# Patient Record
Sex: MALE | Race: BLACK OR AFRICAN AMERICAN | NOT HISPANIC OR LATINO | Employment: FULL TIME | URBAN - METROPOLITAN AREA
[De-identification: names, ages, dates, MRNs, and addresses within clinical notes are randomized per-mention and may not be internally consistent; named-entity substitution may affect disease eponyms.]

---

## 2019-07-29 ENCOUNTER — HISTORICAL (OUTPATIENT)
Dept: SURGERY | Facility: HOSPITAL | Age: 59
End: 2019-07-29

## 2019-07-29 LAB — CRC RECOMMENDATION EXT: NORMAL

## 2019-08-14 ENCOUNTER — HISTORICAL (OUTPATIENT)
Dept: PREADMISSION TESTING | Facility: HOSPITAL | Age: 59
End: 2019-08-14

## 2021-11-12 ENCOUNTER — HISTORICAL (OUTPATIENT)
Dept: ADMINISTRATIVE | Facility: HOSPITAL | Age: 61
End: 2021-11-12

## 2021-11-26 ENCOUNTER — HISTORICAL (OUTPATIENT)
Dept: LAB | Facility: HOSPITAL | Age: 61
End: 2021-11-26

## 2021-12-01 ENCOUNTER — HISTORICAL (OUTPATIENT)
Dept: SURGERY | Facility: HOSPITAL | Age: 61
End: 2021-12-01

## 2022-04-07 ENCOUNTER — HISTORICAL (OUTPATIENT)
Dept: ADMINISTRATIVE | Facility: HOSPITAL | Age: 62
End: 2022-04-07
Payer: COMMERCIAL

## 2022-04-23 VITALS
DIASTOLIC BLOOD PRESSURE: 75 MMHG | SYSTOLIC BLOOD PRESSURE: 125 MMHG | BODY MASS INDEX: 33.89 KG/M2 | WEIGHT: 236.75 LBS | HEIGHT: 70 IN

## 2022-04-25 ENCOUNTER — HISTORICAL (OUTPATIENT)
Dept: ADMINISTRATIVE | Facility: HOSPITAL | Age: 62
End: 2022-04-25
Payer: COMMERCIAL

## 2022-04-28 NOTE — OP NOTE
DATE OF SURGERY:    07/29/2019    SURGEON:  Tobin Pemberton MD    PREOPERATIVE DIAGNOSIS:  Screening.    POSTOPERATIVE DIAGNOSIS:  Normal colon.    PROCEDURE:  Screening colonoscopy to the cecum.    ANESTHESIA:  Propofol.    ESTIMATED BLOOD LOSS:  None.    SPECIMENS:  None.    COMPLICATIONS:  None.    OPERATIVE PROCEDURE IN DETAIL:  After informed consent was obtained, the patient was brought to the operating room.  Continuous EKG, pulse oximetry, and intermittent blood pressure monitor were utilized.  The patient was placed in the left lateral decubitus position and propofol was administered by a member of the anesthesia team.  A digital rectal exam was performed and no mass or gross blood was identified.  Colonoscope was inserted into the rectum, insufflated and gently advanced in a retrograde fashion to the level of the cecum.  The ileocecal valve and appendiceal orifice were identified.  Photos were taken.  Colonoscope was slowly retracted and the colonic mucosa examined in a 360-degree fashion.  No masses, polyps or other mucosal abnormalities were noted.  Colonoscope was retracted into the rectum while decompressing the colon.  It was retroflexed and no anorectal pathology was seen.  Colonoscope was returned to the neutral position and completely removed from the patient.  He tolerated the procedure well and there were no complications.  Subsequently, he was transferred to recovery in satisfactory condition.      PLAN:  Recommend repeat colonoscopy in 10 years.        ______________________________  Tobin Pemberton MD    MH/UED  DD:  07/29/2019  Time:  12:21PM  DT:  07/29/2019  Time:  12:27PM  Job #:  [id number]

## 2022-05-01 NOTE — HISTORICAL [facility] CERNER
This is a historical note converted from Wil. Formatting and pictures may have been removed.  Please reference Wil for original formatting and attached multimedia. OPERATIVE REPORT  ?  DATE: 12/1/2021  ?  ASSISTANT:?Araceli Wetzel NP (The certified assistant provided critical assistance by holding instruments, retractors, manipulation of the operative extremity, and closure)  ?  PREOPERATIVE DIAGNOSIS:  1. ?Right?rotator cuff tear  2. ?Acromioclavicular arthrosis  ?  POSTOPERATIVE DIAGNOSIS:  1. ?Rotator cuff tear  2. ?Acromioclavicular arthrosis  3. ?Intra-articular biceps tendon tear and medial subluxation  ?  PROCEDURES:  1. ?Diagnostic right shoulder arthroscopy  2. ?Arthroscopic biceps tenodesis  3. ?Distal clavicle resection  4. ?Arthroscopic rotator cuff repair with PRP  ?  ANESTHESIA:  General anesthesia with supraclavicular nerve block  ?  BLOOD LOSS:  Less than 10 cc  ?  DVT PROPHYLAXIS:  Aspirin for 10 to 14 days  ?  INSTRUMENTATION:  Biceps tenodesis: Arthrex?3.9 mm bio composite?swivel lock anchor  Rotator cuff repair: Arthrex 5.5 mm peek swivel lock anchor for lateral row repair  Platelet rich plasma: 120 cc draw, 4 cc injection  ?  PROCEDURE IN DETAIL:  ?  Diagnostic arthroscopy of shoulder  ?  The examination under anesthesia was performed for skin defects and other contraindications and none were found.The patient was carefully placed in a lateral decubitus position. All bony prominences were padded and sterile U-drape was applied. Patients operative extremity was placed in suspension device with 7-10lbs of weight applied. The skin was prepped in normal sterile fashion. A time out was performed to confirm correct operative extremity, allergies, and antibiotic infusion. All portals were made with an 11-blade and an 18-gauge spinal needle was used to help probe and find proper alignment for the portals. After creating posterior portal, an arthroscope was inserted into the glenohumeral joint. A  diagnostic arthroscopy was then performed in this sequential order: biceps anchor, rotator interval, subscapularis tendon, superior glenohumeral ligament, middle glenohumeral ligament, inferior glenohumeral ligament, anterior and inferior capsular attachments, anterior, inferior, and posterior labrum, teres minor tendon, infraspinatus tendon, and supraspinatus tendon, and biceps tendon in the rotator interval.  ?  The certified assistant provided critical assistance by holding instruments including the arthroscope to provide adequate visualization of the procedure, as well as assisting in wound closure.  ?  At the end of the procedure the portals were closed with mastisol around the wound and placement of steri-strips. The patient tolerated the procedure well and taken to the recovery room in good condition. ?  ?  Arthroscopic Biceps Tenodesis  ?  Viewing through the posterior portal, I can visualize the biceps anchor and there was subluxation and tearing within the biceps anchor. ?Viewing through posterior portal, a loop suture was passed around the midportion of the intra-articular portion of the biceps tendon, working through the anterior portal. ?I then pierced through the tendon and grabbed the distal end of the loop suture and pulled it through the tendon to snag the bicep tendon. ?I then cut the biceps tendon at the base as it attaches to the superior labrum. ?And then placed the anterior portal just superior to the subscapularis tendon as it attaches to the humeral head. ?I then used a tap. ?I then placed the suture through the anchor and then placed the anchor into the tapped hole. ?I then used an ablator to remove the other end of the biceps tendon. ?I then probed the tenodesis to make sure it was secure.  ?  Distal clavicle resection  ?  Viewing through the lateral portal there was osteophyte formation and arthrosis noted of the distal clavicle. ?A distal clavicle resection was then carried out. Viewing  from the posterior portal, the shaver was used to debride the acromioclavicular joint and associated soft tissues through the anterior portal. Once cleared of the soft tissue, the lolly was used to resect the distal end of the clavicle concentrating initially upon the inferior and distal clavicular spurs. The resection was then carried superiorly to remove the upper end of the clavicle to remove approximately 5-7mm of spurs. Then the lolly was used on the acromion to remove 2-3mm of bone. The resection was then measured with a probe to confirm the 8-10mm of resection.?  ?  Arthroscopic rotator cuff repair with PRP  ?  Viewing from the posterior portal we can visualize the rotator cuff tear laterally. ?The greater tuberosity attachment for the rotator cuff was debrided to good bony base. ?An anchor was placed just lateral to the articular surface of the humeral head on the greater tuberosity near the rotator cuff attachment. ?Sutures were then passed through the rotator cuff just medial to the rotator cuff musculo-tendonis interval. ?The sutures were then tied with a sliding locking knot. ?There was good reapproximation and repair of the rotator cuff to the footprint.  ?  I then placed an 18-gauge spinal needle into the repair site. ?I removed all of the inflow and outflow tubings and injected platelet rich plasma into the repair site.

## 2022-05-12 ENCOUNTER — OFFICE VISIT (OUTPATIENT)
Dept: ORTHOPEDICS | Facility: CLINIC | Age: 62
End: 2022-05-12
Payer: COMMERCIAL

## 2022-05-12 VITALS
HEIGHT: 70 IN | DIASTOLIC BLOOD PRESSURE: 85 MMHG | WEIGHT: 238 LBS | SYSTOLIC BLOOD PRESSURE: 161 MMHG | BODY MASS INDEX: 34.07 KG/M2 | HEART RATE: 68 BPM

## 2022-05-12 DIAGNOSIS — Z98.890 STATUS POST RIGHT ROTATOR CUFF REPAIR: Primary | ICD-10-CM

## 2022-05-12 DIAGNOSIS — M24.611 ARTHROFIBROSIS OF RIGHT SHOULDER: ICD-10-CM

## 2022-05-12 PROCEDURE — 3077F PR MOST RECENT SYSTOLIC BLOOD PRESSURE >= 140 MM HG: ICD-10-PCS | Mod: CPTII,,, | Performed by: ORTHOPAEDIC SURGERY

## 2022-05-12 PROCEDURE — 3079F DIAST BP 80-89 MM HG: CPT | Mod: CPTII,,, | Performed by: ORTHOPAEDIC SURGERY

## 2022-05-12 PROCEDURE — 3079F PR MOST RECENT DIASTOLIC BLOOD PRESSURE 80-89 MM HG: ICD-10-PCS | Mod: CPTII,,, | Performed by: ORTHOPAEDIC SURGERY

## 2022-05-12 PROCEDURE — 3008F BODY MASS INDEX DOCD: CPT | Mod: CPTII,,, | Performed by: ORTHOPAEDIC SURGERY

## 2022-05-12 PROCEDURE — 1159F MED LIST DOCD IN RCRD: CPT | Mod: CPTII,,, | Performed by: ORTHOPAEDIC SURGERY

## 2022-05-12 PROCEDURE — 1159F PR MEDICATION LIST DOCUMENTED IN MEDICAL RECORD: ICD-10-PCS | Mod: CPTII,,, | Performed by: ORTHOPAEDIC SURGERY

## 2022-05-12 PROCEDURE — 3008F PR BODY MASS INDEX (BMI) DOCUMENTED: ICD-10-PCS | Mod: CPTII,,, | Performed by: ORTHOPAEDIC SURGERY

## 2022-05-12 PROCEDURE — 3077F SYST BP >= 140 MM HG: CPT | Mod: CPTII,,, | Performed by: ORTHOPAEDIC SURGERY

## 2022-05-12 PROCEDURE — 1160F RVW MEDS BY RX/DR IN RCRD: CPT | Mod: CPTII,,, | Performed by: ORTHOPAEDIC SURGERY

## 2022-05-12 PROCEDURE — 99213 OFFICE O/P EST LOW 20 MIN: CPT | Mod: ,,, | Performed by: ORTHOPAEDIC SURGERY

## 2022-05-12 PROCEDURE — 99213 PR OFFICE/OUTPT VISIT, EST, LEVL III, 20-29 MIN: ICD-10-PCS | Mod: ,,, | Performed by: ORTHOPAEDIC SURGERY

## 2022-05-12 PROCEDURE — 1160F PR REVIEW ALL MEDS BY PRESCRIBER/CLIN PHARMACIST DOCUMENTED: ICD-10-PCS | Mod: CPTII,,, | Performed by: ORTHOPAEDIC SURGERY

## 2022-05-12 RX ORDER — MELOXICAM 7.5 MG/1
7.5 TABLET ORAL 2 TIMES DAILY PRN
COMMUNITY
Start: 2022-04-25

## 2022-05-12 RX ORDER — AMLODIPINE BESYLATE 5 MG/1
5 TABLET ORAL
COMMUNITY
Start: 2021-11-09

## 2022-05-12 RX ORDER — MONTELUKAST SODIUM 10 MG/1
10 TABLET ORAL DAILY
COMMUNITY
Start: 2022-02-26

## 2022-05-12 NOTE — PROGRESS NOTES
History of present illness:    PROCEDURES:  1. Diagnostic right shoulder arthroscopy  2. Arthroscopic biceps tenodesis  3. Distal clavicle resection  4. Arthroscopic rotator cuff repair with PRP  12/14/2021 patient presents approximately 2 weeks status post the above-noted procedure. He is doing well and currently participating in formal physical therapy. Compliant with abduction sling.  1/27/2022 this patient comes in today stating he still continues to have pain in his right shoulder especially at night.  3/31/2022 patient presents 4 months status post above-noted procedure. He reports continued issues with his range of motion, particularly when trying to perform forward flexion. He denies any continued pain in his right shoulder, just some mild discomfort.  5/12/2022 the patient comes in today stating that his pain is tremendously improved.  He still has some issues with function specifically with forward elevation of the shoulder.  He is still limited.      Past Medical History:   Diagnosis Date    Hypertension        Past Surgical History:   Procedure Laterality Date    SHOULDER SURGERY      WISDOM TOOTH EXTRACTION         Current Outpatient Medications   Medication Sig    amLODIPine (NORVASC) 5 MG tablet Take 5 mg by mouth.    montelukast (SINGULAIR) 10 mg tablet Take 10 mg by mouth once daily.    meloxicam (MOBIC) 7.5 MG tablet Take 7.5 mg by mouth 2 (two) times daily as needed. for pain     No current facility-administered medications for this visit.       Review of patient's allergies indicates:  No Known Allergies    History reviewed. No pertinent family history.    Social History     Socioeconomic History    Marital status:    Tobacco Use    Smoking status: Never Smoker    Smokeless tobacco: Never Used       Chief Complaint:   Chief Complaint   Patient presents with    Right Shoulder - Follow-up    Follow-up     5 month s/p Right RTCR bicep tenotomy DCR 12/1/21 [facility]       Consulting  "Physician: No ref. provider found      Review of Systems:  All review of systems negative except for those stated in the HPI.    Examination:    Vital Signs:    Vitals:    05/12/22 1353   BP: (!) 161/85   Pulse: 68   Weight: 108 kg (238 lb)   Height: 5' 10" (1.778 m)       Body mass index is 34.15 kg/m².    Physical Exam:   General: Well-developed, well-nourished.  Neuro: Alert and oriented x 3.  Psych: Normal mood and affect.  Right shoulder exam: Passive forward flexion to 139 degrees. Passive external rotation 75 degrees. 3/5 strength. Sensation intact.      Assessment: Status post right rotator cuff repair    Arthrofibrosis of right shoulder        Plan:    I am concerned about his inability to forward flex is on past 140°.  He actually has decent external rotation.  This makes me think that he may not have healed his rotator cuff repair.  I will keep doing physical therapy for another 2 months.  I will see him back after this and if he is not dramatically improved, plan to repeat an MRI of his right shoulder.         No follow-ups on file.      DISCLAIMER: This note may have been dictated using voice recognition software and may contain grammatical errors.     NOTE: Consult report sent to referring provider via [employer] EMR.    "

## 2022-07-19 ENCOUNTER — OFFICE VISIT (OUTPATIENT)
Dept: ORTHOPEDICS | Facility: CLINIC | Age: 62
End: 2022-07-19
Payer: COMMERCIAL

## 2022-07-19 VITALS
BODY MASS INDEX: 34.07 KG/M2 | HEIGHT: 70 IN | DIASTOLIC BLOOD PRESSURE: 85 MMHG | WEIGHT: 238 LBS | SYSTOLIC BLOOD PRESSURE: 161 MMHG | HEART RATE: 68 BPM

## 2022-07-19 DIAGNOSIS — M24.611 ARTHROFIBROSIS OF RIGHT SHOULDER: ICD-10-CM

## 2022-07-19 DIAGNOSIS — G89.29 CHRONIC RIGHT SHOULDER PAIN: ICD-10-CM

## 2022-07-19 DIAGNOSIS — Z98.890 STATUS POST RIGHT ROTATOR CUFF REPAIR: Primary | ICD-10-CM

## 2022-07-19 DIAGNOSIS — M25.511 CHRONIC RIGHT SHOULDER PAIN: ICD-10-CM

## 2022-07-19 PROCEDURE — 3077F PR MOST RECENT SYSTOLIC BLOOD PRESSURE >= 140 MM HG: ICD-10-PCS | Mod: CPTII,,,

## 2022-07-19 PROCEDURE — 1159F MED LIST DOCD IN RCRD: CPT | Mod: CPTII,,,

## 2022-07-19 PROCEDURE — 99214 PR OFFICE/OUTPT VISIT, EST, LEVL IV, 30-39 MIN: ICD-10-PCS | Mod: ,,,

## 2022-07-19 PROCEDURE — 3079F DIAST BP 80-89 MM HG: CPT | Mod: CPTII,,,

## 2022-07-19 PROCEDURE — 1159F PR MEDICATION LIST DOCUMENTED IN MEDICAL RECORD: ICD-10-PCS | Mod: CPTII,,,

## 2022-07-19 PROCEDURE — 99214 OFFICE O/P EST MOD 30 MIN: CPT | Mod: ,,,

## 2022-07-19 PROCEDURE — 3079F PR MOST RECENT DIASTOLIC BLOOD PRESSURE 80-89 MM HG: ICD-10-PCS | Mod: CPTII,,,

## 2022-07-19 PROCEDURE — 3008F PR BODY MASS INDEX (BMI) DOCUMENTED: ICD-10-PCS | Mod: CPTII,,,

## 2022-07-19 PROCEDURE — 3077F SYST BP >= 140 MM HG: CPT | Mod: CPTII,,,

## 2022-07-19 PROCEDURE — 3008F BODY MASS INDEX DOCD: CPT | Mod: CPTII,,,

## 2022-07-19 NOTE — PROGRESS NOTES
[facility]  Orthopedic Clinic Note      Chief Complaint:   Chief Complaint   Patient presents with    Right Shoulder - Pain    Pain     7 month s/p Right RTCR sx 12/1/22 GL 7/19/22, haroon stated his PT is not helping at all states after PT he feels like he gets a frozen shoulder now his left isde is starting to hurt as well from over compensating      Referring Physician: No ref. provider found      History of present illness:    PROCEDURES:  1. Diagnostic right shoulder arthroscopy  2. Arthroscopic biceps tenodesis  3. Distal clavicle resection  4. Arthroscopic rotator cuff repair with PRP  12/14/2021 patient presents approximately 2 weeks status post the above-noted procedure. He is doing well and currently participating in formal physical therapy. Compliant with abduction sling.  1/27/2022 this patient comes in today stating he still continues to have pain in his right shoulder especially at night.  3/31/2022 patient presents 4 months status post above-noted procedure. He reports continued issues with his range of motion, particularly when trying to perform forward flexion. He denies any continued pain in his right shoulder, just some mild discomfort.  5/12/2022 the patient comes in today stating that his pain is tremendously improved.  He still has some issues with function specifically with forward elevation of the shoulder.  He is still limited.  07/19/2022 patient presents for re-evaluation of his right shoulder.  He is 7 months status post the above-noted procedure.  He has been participating in formal physical therapy since his prior visit.  He reports continued issues with range of motion and pain in his right shoulder.  He reports that the limitations in his range of motion or affecting his ability to perform his normal activities of daily living and function at work.  He has been treated with extensive physical therapy and oral anti-inflammatories with no improvement.      Past Medical  "History:   Diagnosis Date    Hypertension        Past Surgical History:   Procedure Laterality Date    SHOULDER SURGERY      WISDOM TOOTH EXTRACTION         Current Outpatient Medications   Medication Sig    amLODIPine (NORVASC) 5 MG tablet Take 5 mg by mouth.    meloxicam (MOBIC) 7.5 MG tablet Take 7.5 mg by mouth 2 (two) times daily as needed. for pain    montelukast (SINGULAIR) 10 mg tablet Take 10 mg by mouth once daily.     No current facility-administered medications for this visit.       Review of patient's allergies indicates:  No Known Allergies    History reviewed. No pertinent family history.    Social History     Socioeconomic History    Marital status:    Tobacco Use    Smoking status: Never Smoker    Smokeless tobacco: Never Used       Chief Complaint:   Chief Complaint   Patient presents with    Right Shoulder - Pain    Pain     7 month s/p Right RTCR sx 12/1/22 GL 7/19/22, haroon stated his PT is not helping at all states after PT he feels like he gets a frozen shoulder now his left isde is starting to hurt as well from over compensating        Consulting Physician: No ref. provider found      Review of Systems:  All review of systems negative except for those stated in the HPI.    Examination:    Vital Signs:    Vitals:    07/19/22 1413   BP: (!) 161/85   Pulse: 68   Weight: 108 kg (238 lb)   Height: 5' 10" (1.778 m)       Body mass index is 34.15 kg/m².    Physical Exam:   General: Well-developed, well-nourished.  Neuro: Alert and oriented x 3.  Psych: Normal mood and affect.  Right shoulder exam: Passive forward flexion to 139 degrees. Passive external rotation 75 degrees. 3/5 strength. Sensation intact.      Assessment: Status post right rotator cuff repair  -     MRI Shoulder Without Contrast Right; Future; Expected date: 07/19/2022    Arthrofibrosis of right shoulder  -     MRI Shoulder Without Contrast Right; Future; Expected date: 07/19/2022    Chronic right shoulder " pain  -     MRI Shoulder Without Contrast Right; Future; Expected date: 07/19/2022        Plan:  He has had no major improvements in his range of motion since his prior visit despite exhaustive conservative treatments.  I am very concerned that his rotator cuff repair has not adequately healed and that is why he continues to struggle with pain and limitations in his range of motion.  Order entered for MRI of the right shoulder to assess his rotator cuff repair as well as the quality of his rotator cuff tissue.  He will return to clinic for review of MRI results once imaging is obtained.  He verbalized understanding of the plan of care with no further questions.         Follow up for review of right shoulder MRI results.      DISCLAIMER: This note may have been dictated using voice recognition software and may contain grammatical errors.     NOTE: Consult report sent to referring provider via [employer].

## 2022-07-26 ENCOUNTER — OFFICE VISIT (OUTPATIENT)
Dept: ORTHOPEDICS | Facility: CLINIC | Age: 62
End: 2022-07-26
Payer: COMMERCIAL

## 2022-07-26 VITALS
SYSTOLIC BLOOD PRESSURE: 161 MMHG | BODY MASS INDEX: 34.07 KG/M2 | WEIGHT: 238 LBS | HEART RATE: 68 BPM | HEIGHT: 70 IN | DIASTOLIC BLOOD PRESSURE: 85 MMHG

## 2022-07-26 DIAGNOSIS — Z98.890 STATUS POST ROTATOR CUFF REPAIR: Primary | ICD-10-CM

## 2022-07-26 PROCEDURE — 1159F PR MEDICATION LIST DOCUMENTED IN MEDICAL RECORD: ICD-10-PCS | Mod: CPTII,,, | Performed by: ORTHOPAEDIC SURGERY

## 2022-07-26 PROCEDURE — 1160F RVW MEDS BY RX/DR IN RCRD: CPT | Mod: CPTII,,, | Performed by: ORTHOPAEDIC SURGERY

## 2022-07-26 PROCEDURE — 3008F PR BODY MASS INDEX (BMI) DOCUMENTED: ICD-10-PCS | Mod: CPTII,,, | Performed by: ORTHOPAEDIC SURGERY

## 2022-07-26 PROCEDURE — 99213 PR OFFICE/OUTPT VISIT, EST, LEVL III, 20-29 MIN: ICD-10-PCS | Mod: ,,, | Performed by: ORTHOPAEDIC SURGERY

## 2022-07-26 PROCEDURE — 1160F PR REVIEW ALL MEDS BY PRESCRIBER/CLIN PHARMACIST DOCUMENTED: ICD-10-PCS | Mod: CPTII,,, | Performed by: ORTHOPAEDIC SURGERY

## 2022-07-26 PROCEDURE — 3079F DIAST BP 80-89 MM HG: CPT | Mod: CPTII,,, | Performed by: ORTHOPAEDIC SURGERY

## 2022-07-26 PROCEDURE — 1159F MED LIST DOCD IN RCRD: CPT | Mod: CPTII,,, | Performed by: ORTHOPAEDIC SURGERY

## 2022-07-26 PROCEDURE — 3077F PR MOST RECENT SYSTOLIC BLOOD PRESSURE >= 140 MM HG: ICD-10-PCS | Mod: CPTII,,, | Performed by: ORTHOPAEDIC SURGERY

## 2022-07-26 PROCEDURE — 3008F BODY MASS INDEX DOCD: CPT | Mod: CPTII,,, | Performed by: ORTHOPAEDIC SURGERY

## 2022-07-26 PROCEDURE — 99213 OFFICE O/P EST LOW 20 MIN: CPT | Mod: ,,, | Performed by: ORTHOPAEDIC SURGERY

## 2022-07-26 PROCEDURE — 3077F SYST BP >= 140 MM HG: CPT | Mod: CPTII,,, | Performed by: ORTHOPAEDIC SURGERY

## 2022-07-26 PROCEDURE — 3079F PR MOST RECENT DIASTOLIC BLOOD PRESSURE 80-89 MM HG: ICD-10-PCS | Mod: CPTII,,, | Performed by: ORTHOPAEDIC SURGERY

## 2022-07-26 NOTE — PROGRESS NOTES
[facility]  Orthopedic Clinic Note      Chief Complaint:   Chief Complaint   Patient presents with    Right Shoulder - Results    Shoulder Pain     MRI results Right shoulder, Right RTCR sx 12/1/22     Referring Physician: No ref. provider found      History of present illness:    PROCEDURES:  1. Diagnostic right shoulder arthroscopy  2. Arthroscopic biceps tenodesis  3. Distal clavicle resection  4. Arthroscopic rotator cuff repair with PRP  12/14/2021 patient presents approximately 2 weeks status post the above-noted procedure. He is doing well and currently participating in formal physical therapy. Compliant with abduction sling.  1/27/2022 this patient comes in today stating he still continues to have pain in his right shoulder especially at night.  3/31/2022 patient presents 4 months status post above-noted procedure. He reports continued issues with his range of motion, particularly when trying to perform forward flexion. He denies any continued pain in his right shoulder, just some mild discomfort.  5/12/2022 the patient comes in today stating that his pain is tremendously improved.  He still has some issues with function specifically with forward elevation of the shoulder.  He is still limited.  07/19/2022 patient presents for re-evaluation of his right shoulder.  He is 7 months status post the above-noted procedure.  He has been participating in formal physical therapy since his prior visit.  He reports continued issues with range of motion and pain in his right shoulder.  He reports that the limitations in his range of motion or affecting his ability to perform his normal activities of daily living and function at work.  He has been treated with extensive physical therapy and oral anti-inflammatories with no improvement.  07/26/2022 this patient comes in today for evaluation of his right shoulder MRI.  It do not agree with the radiology reading.  There is a significant amount of tendinosis  "that is questionable for inadequate rotator cuff healing.  There is a huge void between the normal rotator cuff tissue and the greater tuberosity.    Past Medical History:   Diagnosis Date    Hypertension        Past Surgical History:   Procedure Laterality Date    SHOULDER SURGERY      WISDOM TOOTH EXTRACTION         Current Outpatient Medications   Medication Sig    amLODIPine (NORVASC) 5 MG tablet Take 5 mg by mouth.    meloxicam (MOBIC) 7.5 MG tablet Take 7.5 mg by mouth 2 (two) times daily as needed. for pain    montelukast (SINGULAIR) 10 mg tablet Take 10 mg by mouth once daily.     No current facility-administered medications for this visit.       Review of patient's allergies indicates:  No Known Allergies    History reviewed. No pertinent family history.    Social History     Socioeconomic History    Marital status:    Tobacco Use    Smoking status: Never Smoker    Smokeless tobacco: Never Used       Chief Complaint:   Chief Complaint   Patient presents with    Right Shoulder - Results    Shoulder Pain     MRI results Right shoulder, Right RTCR sx 12/1/22       Consulting Physician: No ref. provider found      Review of Systems:  All review of systems negative except for those stated in the HPI.    Examination:    Vital Signs:    Vitals:    07/26/22 1057   BP: (!) 161/85   Pulse: 68   Weight: 108 kg (238 lb)   Height: 5' 10" (1.778 m)       Body mass index is 34.15 kg/m².    Physical Exam:   General: Well-developed, well-nourished.  Neuro: Alert and oriented x 3.  Psych: Normal mood and affect.  Right shoulder exam: Passive forward flexion to 139 degrees. Passive external rotation 75 degrees. 3/5 strength. Sensation intact.      Assessment: Status post rotator cuff repair        Plan:    I had a long discussion with this patient about his options.  He is not in favor of a revision surgery at this time.  I am okay with him continuing to work on the strength in that right shoulder.  If it " becomes more symptomatic with pain or becomes less functional, then I would recommend revision rotator cuff repair.  The surgical plan would be to perform a revision rotator cuff repair with a biological scaffold and lysis of adhesions to help with range of motion.  I will see him back in 4 months.         No follow-ups on file.      DISCLAIMER: This note may have been dictated using voice recognition software and may contain grammatical errors.     NOTE: Consult report sent to referring provider via [employer] EMR.

## 2022-10-03 ENCOUNTER — DOCUMENTATION ONLY (OUTPATIENT)
Dept: ADMINISTRATIVE | Facility: HOSPITAL | Age: 62
End: 2022-10-03
Payer: COMMERCIAL

## 2022-11-08 ENCOUNTER — OFFICE VISIT (OUTPATIENT)
Dept: ORTHOPEDICS | Facility: CLINIC | Age: 62
End: 2022-11-08
Payer: COMMERCIAL

## 2022-11-08 VITALS — HEIGHT: 70 IN | BODY MASS INDEX: 33.93 KG/M2 | WEIGHT: 237 LBS

## 2022-11-08 DIAGNOSIS — Z98.890 STATUS POST ROTATOR CUFF REPAIR: Primary | ICD-10-CM

## 2022-11-08 DIAGNOSIS — M24.611 ARTHROFIBROSIS OF RIGHT SHOULDER: ICD-10-CM

## 2022-11-08 PROCEDURE — 1159F PR MEDICATION LIST DOCUMENTED IN MEDICAL RECORD: ICD-10-PCS | Mod: CPTII,,, | Performed by: ORTHOPAEDIC SURGERY

## 2022-11-08 PROCEDURE — 3008F PR BODY MASS INDEX (BMI) DOCUMENTED: ICD-10-PCS | Mod: CPTII,,, | Performed by: ORTHOPAEDIC SURGERY

## 2022-11-08 PROCEDURE — 99213 PR OFFICE/OUTPT VISIT, EST, LEVL III, 20-29 MIN: ICD-10-PCS | Mod: ,,, | Performed by: ORTHOPAEDIC SURGERY

## 2022-11-08 PROCEDURE — 99213 OFFICE O/P EST LOW 20 MIN: CPT | Mod: ,,, | Performed by: ORTHOPAEDIC SURGERY

## 2022-11-08 PROCEDURE — 1159F MED LIST DOCD IN RCRD: CPT | Mod: CPTII,,, | Performed by: ORTHOPAEDIC SURGERY

## 2022-11-08 PROCEDURE — 1160F RVW MEDS BY RX/DR IN RCRD: CPT | Mod: CPTII,,, | Performed by: ORTHOPAEDIC SURGERY

## 2022-11-08 PROCEDURE — 3008F BODY MASS INDEX DOCD: CPT | Mod: CPTII,,, | Performed by: ORTHOPAEDIC SURGERY

## 2022-11-08 PROCEDURE — 1160F PR REVIEW ALL MEDS BY PRESCRIBER/CLIN PHARMACIST DOCUMENTED: ICD-10-PCS | Mod: CPTII,,, | Performed by: ORTHOPAEDIC SURGERY

## 2022-11-08 RX ORDER — FLUTICASONE PROPIONATE 50 MCG
1 SPRAY, SUSPENSION (ML) NASAL 2 TIMES DAILY
COMMUNITY
Start: 2022-11-04

## 2022-11-08 NOTE — PROGRESS NOTES
[facility]  [facility]thopedic Clinic Note      Chief Complaint:   Chief Complaint   Patient presents with    Right Shoulder - Follow-up    Follow-up     4 m f/u R RTCR sx 12/1/21, reports minimal pain, soreness, done with PT, no ice/brace/tylenol prn, heating occasionally, no reinjury, unable to get vitals     Referring Physician: No ref. provider found      History of present illness:    PROCEDURES:  1. Diagnostic right shoulder arthroscopy  2. Arthroscopic biceps tenodesis  3. Distal clavicle resection  4. Arthroscopic rotator cuff repair with PRP  12/14/2021 patient presents approximately 2 weeks status post the above-noted procedure. He is doing well and currently participating in formal physical therapy. Compliant with abduction sling.  1/27/2022 this patient comes in today stating he still continues to have pain in his right shoulder especially at night.  3/31/2022 patient presents 4 months status post above-noted procedure. He reports continued issues with his range of motion, particularly when trying to perform forward flexion. He denies any continued pain in his right shoulder, just some mild discomfort.  5/12/2022 the patient comes in today stating that his pain is tremendously improved.  He still has some issues with function specifically with forward elevation of the shoulder.  He is still limited.  07/19/2022 patient presents for re-evaluation of his right shoulder.  He is 7 months status post the above-noted procedure.  He has been participating in formal physical therapy since his prior visit.  He reports continued issues with range of motion and pain in his right shoulder.  He reports that the limitations in his range of motion or affecting his ability to perform his normal activities of daily living and function at work.  He has been treated with extensive physical therapy and oral anti-inflammatories with no improvement.  07/26/2022 this patient comes in today for evaluation of his right  shoulder MRI.  It do not agree with the radiology reading.  There is a significant amount of tendinosis that is questionable for inadequate rotator cuff healing.  There is a huge void between the normal rotator cuff tissue and the greater tuberosity.  11/08/2022 this patient comes in today with continued complaints of minimal pain and soreness.  He does feel as if his shoulder is better but he still continues to have symptoms.  He states that he can do most of his ADLs without any issues.    Past Medical History:   Diagnosis Date    Hypertension        Past Surgical History:   Procedure Laterality Date    COLONOSCOPY  07/29/2019    Tobin Pemberton MD    SHOULDER SURGERY      WISDOM TOOTH EXTRACTION         Current Outpatient Medications   Medication Sig    amLODIPine (NORVASC) 5 MG tablet Take 5 mg by mouth.    fluticasone propionate (FLONASE) 50 mcg/actuation nasal spray 1 spray by Each Nostril route 2 (two) times daily.    montelukast (SINGULAIR) 10 mg tablet Take 10 mg by mouth once daily.    meloxicam (MOBIC) 7.5 MG tablet Take 7.5 mg by mouth 2 (two) times daily as needed. for pain     No current facility-administered medications for this visit.       Review of patient's allergies indicates:  No Known Allergies    History reviewed. No pertinent family history.    Social History     Socioeconomic History    Marital status:    Tobacco Use    Smoking status: Former     Packs/day: 2.00     Years: 15.00     Pack years: 30.00     Types: Cigarettes    Smokeless tobacco: Never   Substance and Sexual Activity    Alcohol use: Not Currently     Alcohol/week: 4.0 standard drinks     Types: 4 Cans of beer per week    Drug use: Not Currently    Sexual activity: Yes     Partners: Female     Birth control/protection: Condom       Chief Complaint:   Chief Complaint   Patient presents with    Right Shoulder - Follow-up    Follow-up     4 m f/u R RTCR sx 12/1/21, reports minimal pain, soreness, done with PT, no  "ice/brace/tylenol prn, heating occasionally, no reinjury, unable to get vitals       Consulting Physician: No ref. provider found      Review of Systems:  All review of systems negative except for those stated in the HPI.    Examination:    Vital Signs:    Vitals:    11/08/22 1046   Weight: 107.5 kg (237 lb)   Height: 5' 10" (1.778 m)   PainSc:   2       Body mass index is 34.01 kg/m².    Physical Exam:   General: Well-developed, well-nourished.  Neuro: Alert and oriented x 3.  Psych: Normal mood and affect.  Right shoulder exam: Passive forward flexion to 160 degrees. Passive external rotation 85 degrees. 4/5 strength. Sensation intact.      Assessment: Status post rotator cuff repair    Arthrofibrosis of right shoulder        Plan:  We did discuss the possibility of revision surgery based on his most recent MRI.  However he is not in favor of that at the time.  I recommend he continue with his home exercise program and come back to see me as needed.  He is in favor of that.           No follow-ups on file.      DISCLAIMER: This note may have been dictated using voice recognition software and may contain grammatical errors.     NOTE: Consult report sent to referring provider via [facility] EMR.      "

## 2022-12-13 ENCOUNTER — PATIENT MESSAGE (OUTPATIENT)
Dept: RESEARCH | Facility: HOSPITAL | Age: 62
End: 2022-12-13
Payer: COMMERCIAL

## 2023-02-08 ENCOUNTER — PATIENT MESSAGE (OUTPATIENT)
Dept: RESEARCH | Facility: HOSPITAL | Age: 63
End: 2023-02-08
Payer: COMMERCIAL

## 2023-03-28 ENCOUNTER — PATIENT MESSAGE (OUTPATIENT)
Dept: RESEARCH | Facility: HOSPITAL | Age: 63
End: 2023-03-28
Payer: COMMERCIAL

## 2023-04-19 ENCOUNTER — PATIENT MESSAGE (OUTPATIENT)
Dept: RESEARCH | Facility: HOSPITAL | Age: 63
End: 2023-04-19
Payer: COMMERCIAL

## 2023-05-02 ENCOUNTER — PATIENT MESSAGE (OUTPATIENT)
Dept: RESEARCH | Facility: HOSPITAL | Age: 63
End: 2023-05-02
Payer: COMMERCIAL

## 2023-09-07 ENCOUNTER — PATIENT MESSAGE (OUTPATIENT)
Dept: RESEARCH | Facility: HOSPITAL | Age: 63
End: 2023-09-07
Payer: COMMERCIAL

## 2024-10-03 ENCOUNTER — LAB VISIT (OUTPATIENT)
Dept: LAB | Facility: HOSPITAL | Age: 64
End: 2024-10-03
Attending: NURSE PRACTITIONER
Payer: COMMERCIAL

## 2024-10-03 DIAGNOSIS — J32.9 RECURRENT SINUSITIS: ICD-10-CM

## 2024-10-03 DIAGNOSIS — M25.50 ARTHRALGIA OF MULTIPLE JOINTS: ICD-10-CM

## 2024-10-03 DIAGNOSIS — R52 BODY ACHES: ICD-10-CM

## 2024-10-03 DIAGNOSIS — R53.82 CHRONIC FATIGUE: ICD-10-CM

## 2024-10-03 DIAGNOSIS — J30.9 RHINITIS, ALLERGIC: Primary | ICD-10-CM

## 2024-10-03 LAB
ALBUMIN SERPL-MCNC: 4.3 G/DL (ref 3.4–4.8)
ALBUMIN/GLOB SERPL: 1.5 RATIO (ref 1.1–2)
ALP SERPL-CCNC: 56 UNIT/L (ref 40–150)
ALT SERPL-CCNC: 17 UNIT/L (ref 0–55)
ANION GAP SERPL CALC-SCNC: 7 MEQ/L
AST SERPL-CCNC: 17 UNIT/L (ref 5–34)
BASOPHILS # BLD AUTO: 0.02 X10(3)/MCL
BASOPHILS NFR BLD AUTO: 0.2 %
BILIRUB SERPL-MCNC: 0.4 MG/DL
BUN SERPL-MCNC: 15.1 MG/DL (ref 8.4–25.7)
CALCIUM SERPL-MCNC: 9.5 MG/DL (ref 8.8–10)
CHLORIDE SERPL-SCNC: 107 MMOL/L (ref 98–107)
CO2 SERPL-SCNC: 25 MMOL/L (ref 23–31)
CREAT SERPL-MCNC: 1.13 MG/DL (ref 0.73–1.18)
CREAT/UREA NIT SERPL: 13
CRP SERPL-MCNC: 2 MG/L
EOSINOPHIL # BLD AUTO: 0 X10(3)/MCL (ref 0–0.9)
EOSINOPHIL NFR BLD AUTO: 0 %
ERYTHROCYTE [DISTWIDTH] IN BLOOD BY AUTOMATED COUNT: 13.3 % (ref 11.5–17)
GFR SERPLBLD CREATININE-BSD FMLA CKD-EPI: >60 ML/MIN/1.73/M2
GLOBULIN SER-MCNC: 2.8 GM/DL (ref 2.4–3.5)
GLUCOSE SERPL-MCNC: 105 MG/DL (ref 82–115)
HCT VFR BLD AUTO: 40.8 % (ref 42–52)
HGB BLD-MCNC: 13.3 G/DL (ref 14–18)
IGA SERPL-MCNC: 251 MG/DL (ref 101–645)
IGG SERPL-MCNC: 1059 MG/DL (ref 540–1822)
IGM SERPL-MCNC: 70 MG/DL (ref 22–240)
IMM GRANULOCYTES # BLD AUTO: 0.03 X10(3)/MCL (ref 0–0.04)
IMM GRANULOCYTES NFR BLD AUTO: 0.3 %
LYMPHOCYTES # BLD AUTO: 1.79 X10(3)/MCL (ref 0.6–4.6)
LYMPHOCYTES NFR BLD AUTO: 16.3 %
MCH RBC QN AUTO: 27.9 PG (ref 27–31)
MCHC RBC AUTO-ENTMCNC: 32.6 G/DL (ref 33–36)
MCV RBC AUTO: 85.7 FL (ref 80–94)
MONO SCR (OHS): NEGATIVE
MONOCYTES # BLD AUTO: 0.51 X10(3)/MCL (ref 0.1–1.3)
MONOCYTES NFR BLD AUTO: 4.6 %
NEUTROPHILS # BLD AUTO: 8.62 X10(3)/MCL (ref 2.1–9.2)
NEUTROPHILS NFR BLD AUTO: 78.6 %
NRBC BLD AUTO-RTO: 0 %
PLATELET # BLD AUTO: 287 X10(3)/MCL (ref 130–400)
PMV BLD AUTO: 10.5 FL (ref 7.4–10.4)
POTASSIUM SERPL-SCNC: 4.2 MMOL/L (ref 3.5–5.1)
PROT SERPL-MCNC: 7.1 GM/DL (ref 5.8–7.6)
RBC # BLD AUTO: 4.76 X10(6)/MCL (ref 4.7–6.1)
SODIUM SERPL-SCNC: 139 MMOL/L (ref 136–145)
WBC # BLD AUTO: 10.97 X10(3)/MCL (ref 4.5–11.5)

## 2024-10-03 PROCEDURE — 86664 EPSTEIN-BARR NUCLEAR ANTIGEN: CPT

## 2024-10-03 PROCEDURE — 86038 ANTINUCLEAR ANTIBODIES: CPT

## 2024-10-03 PROCEDURE — 80053 COMPREHEN METABOLIC PANEL: CPT

## 2024-10-03 PROCEDURE — 86140 C-REACTIVE PROTEIN: CPT

## 2024-10-03 PROCEDURE — 86200 CCP ANTIBODY: CPT

## 2024-10-03 PROCEDURE — 86617 LYME DISEASE ANTIBODY: CPT

## 2024-10-03 PROCEDURE — 86308 HETEROPHILE ANTIBODY SCREEN: CPT

## 2024-10-03 PROCEDURE — 36415 COLL VENOUS BLD VENIPUNCTURE: CPT

## 2024-10-03 PROCEDURE — 85025 COMPLETE CBC W/AUTO DIFF WBC: CPT

## 2024-10-03 PROCEDURE — 82784 ASSAY IGA/IGD/IGG/IGM EACH: CPT

## 2024-10-03 PROCEDURE — 86665 EPSTEIN-BARR CAPSID VCA: CPT

## 2024-10-04 LAB
CMV DNA SERPL NAA+PROBE-ACNC: NORMAL IU/ML
EBV NA AB SER QL: POSITIVE
EBV VCA IGG SER QL: POSITIVE
EBV VCA IGM SER QL: NEGATIVE
IMMUNOLOGIST REVIEW: NORMAL

## 2024-10-05 LAB
B BURGDOR AB PATRN SER IB-IMP: NORMAL
B BURGDOR IGG PATRN SER IB-IMP: NORMAL KDA
B BURGDOR IGG SER QL IB: NEGATIVE
B BURGDOR IGM PATRN SER IB-IMP: NORMAL KDA
B BURGDOR IGM SER QL IB: NEGATIVE
CCP IGG SERPL-ACNC: <15.6 U
IMMUNOLOGIST REVIEW: NORMAL
NUCLEAR IGG SERPL IA-ACNC: 0.7 U